# Patient Record
Sex: MALE | Race: WHITE | NOT HISPANIC OR LATINO | Employment: OTHER | URBAN - NONMETROPOLITAN AREA
[De-identification: names, ages, dates, MRNs, and addresses within clinical notes are randomized per-mention and may not be internally consistent; named-entity substitution may affect disease eponyms.]

---

## 2017-11-17 ENCOUNTER — DOCTOR'S OFFICE (OUTPATIENT)
Dept: URBAN - NONMETROPOLITAN AREA CLINIC 1 | Facility: CLINIC | Age: 82
Setting detail: OPHTHALMOLOGY
End: 2017-11-17
Payer: COMMERCIAL

## 2017-11-17 DIAGNOSIS — H02.834: ICD-10-CM

## 2017-11-17 DIAGNOSIS — H02.831: ICD-10-CM

## 2017-11-17 DIAGNOSIS — H43.813: ICD-10-CM

## 2017-11-17 DIAGNOSIS — H53.122: ICD-10-CM

## 2017-11-17 DIAGNOSIS — H18.223: ICD-10-CM

## 2017-11-17 DIAGNOSIS — H04.123: ICD-10-CM

## 2017-11-17 DIAGNOSIS — S01.101A: ICD-10-CM

## 2017-11-17 PROCEDURE — 92226 OPHTHALMOSCOPY EXT SUBSEQUENT: CPT | Performed by: OPHTHALMOLOGY

## 2017-11-17 PROCEDURE — 99214 OFFICE O/P EST MOD 30 MIN: CPT | Performed by: OPHTHALMOLOGY

## 2017-11-17 ASSESSMENT — LID POSITION - PTOSIS
OD_PTOSIS: 2+
OS_PTOSIS: 2+

## 2017-11-17 ASSESSMENT — REFRACTION_MANIFEST
OD_AXIS: 097
OD_VA3: 20/
OS_VA2: 20/
OD_SPHERE: +0.75
OD_ADD: +2.75
OD_VA3: 20/
OD_VA2: 20/
OD_VA1: 20/
OS_ADD: +2.75
OS_VA3: 20/
OD_VA3: 20/
OD_VA1: 20/
OU_VA: 20/
OU_VA: 20/
OD_VA2: 20/
OS_VA2: 20/25
OS_AXIS: 077
OU_VA: 20/
OS_CYLINDER: -2.50
OS_VA1: 20/25-2
OS_VA1: 20/
OS_SPHERE: +0.50
OD_CYLINDER: -2.25
OS_VA3: 20/
OS_VA3: 20/
OD_VA2: 20/25
OS_VA2: 20/
OD_VA1: 20/25-2
OS_VA1: 20/

## 2017-11-17 ASSESSMENT — REFRACTION_CURRENTRX
OS_VPRISM_DIRECTION: BF
OD_OVR_VA: 20/
OD_ADD: +2.75
OS_OVR_VA: 20/
OS_OVR_VA: 20/
OD_SPHERE: +0.75
OS_AXIS: 078
OS_ADD: +2.75
OD_VPRISM_DIRECTION: BF
OS_CYLINDER: -2.25
OD_OVR_VA: 20/
OD_OVR_VA: 20/
OD_CYLINDER: -2.00
OD_AXIS: 100
OS_SPHERE: +0.50
OS_OVR_VA: 20/

## 2017-11-17 ASSESSMENT — SPHEQUIV_DERIVED
OD_SPHEQUIV: 1
OS_SPHEQUIV: -0.125
OS_SPHEQUIV: -0.75
OD_SPHEQUIV: -0.375

## 2017-11-17 ASSESSMENT — LID POSITION - DERMATOCHALASIS
OD_DERMATOCHALASIS: 2+
OS_DERMATOCHALASIS: 2+

## 2017-11-17 ASSESSMENT — REFRACTION_AUTOREFRACTION
OS_SPHERE: +1.00
OD_CYLINDER: -3.00
OS_AXIS: 075
OD_SPHERE: +2.50
OD_AXIS: 097
OS_CYLINDER: -2.25

## 2017-11-17 ASSESSMENT — LID EXAM ASSESSMENTS
OD_ECCHYMOSIS: RLL RUL 2+
OD_EDEMA: RLL RUL 2+
OD_MEIBOMITIS: 1+
OS_MEIBOMITIS: 1+

## 2017-11-17 ASSESSMENT — VISUAL ACUITY
OD_BCVA: 20/60
OS_BCVA: 20/30

## 2017-11-17 ASSESSMENT — SUPERFICIAL PUNCTATE KERATITIS (SPK)
OS_SPK: 2+ 3+
OD_SPK: T 1+

## 2017-11-17 ASSESSMENT — CONFRONTATIONAL VISUAL FIELD TEST (CVF)
OS_FINDINGS: FULL
OD_FINDINGS: FULL

## 2017-11-28 ENCOUNTER — OPTICAL OFFICE (OUTPATIENT)
Dept: URBAN - NONMETROPOLITAN AREA CLINIC 4 | Facility: CLINIC | Age: 82
Setting detail: OPHTHALMOLOGY
End: 2017-11-28

## 2017-11-28 ENCOUNTER — DOCTOR'S OFFICE (OUTPATIENT)
Dept: URBAN - NONMETROPOLITAN AREA CLINIC 1 | Facility: CLINIC | Age: 82
Setting detail: OPHTHALMOLOGY
End: 2017-11-28

## 2017-11-28 DIAGNOSIS — H52.4: ICD-10-CM

## 2017-11-28 DIAGNOSIS — H52.223: ICD-10-CM

## 2017-11-28 DIAGNOSIS — Z96.1: ICD-10-CM

## 2017-11-28 PROCEDURE — V2204 LENS SPHCY BIFOCAL 4.00D/2.1: HCPCS | Performed by: OPTOMETRIST

## 2017-11-28 PROCEDURE — V2020 VISION SVCS FRAMES PURCHASES: HCPCS | Performed by: OPTOMETRIST

## 2017-11-28 PROCEDURE — 92015 DETERMINE REFRACTIVE STATE: CPT | Performed by: OPTOMETRIST

## 2017-11-28 ASSESSMENT — REFRACTION_CURRENTRX
OS_OVR_VA: 20/
OD_OVR_VA: 20/
OS_OVR_VA: 20/
OS_OVR_VA: 20/
OD_OVR_VA: 20/
OS_CYLINDER: -2.25
OS_ADD: +2.75
OD_AXIS: 101
OS_SPHERE: +0.50
OS_VPRISM_DIRECTION: BF
OD_OVR_VA: 20/
OD_VPRISM_DIRECTION: BF
OD_CYLINDER: -2.25
OS_AXIS: 079
OD_ADD: +2.75
OD_SPHERE: +0.75

## 2017-11-28 ASSESSMENT — REFRACTION_AUTOREFRACTION
OD_SPHERE: +2.25
OS_SPHERE: +0.75
OD_AXIS: 092
OS_CYLINDER: -2.75
OS_AXIS: 085
OD_CYLINDER: -4.00

## 2017-11-28 ASSESSMENT — REFRACTION_MANIFEST
OD_VA3: 20/
OU_VA: 20/
OS_VA2: 20/
OS_VA1: 20/
OD_VA1: 20/
OS_VA3: 20/
OD_VA3: 20/
OU_VA: 20/
OS_VA1: 20/
OD_VA2: 20/
OD_VA2: 20/
OS_VA3: 20/
OS_VA2: 20/
OD_VA1: 20/

## 2017-11-28 ASSESSMENT — SPHEQUIV_DERIVED
OS_SPHEQUIV: -0.625
OD_SPHEQUIV: 0.25

## 2017-11-28 ASSESSMENT — REFRACTION_OUTSIDERX
OS_AXIS: 077
OS_ADD: +2.75
OD_VA3: 20/
OS_CYLINDER: -2.50
OD_ADD: +2.75
OS_VA2: 20/25
OS_VA3: 20/
OD_AXIS: 097
OU_VA: 20/
OD_VA1: 20/25-2
OD_CYLINDER: -2.25
OD_VA2: 20/25
OS_VA1: 20/25-2
OS_SPHERE: +0.50
OD_SPHERE: +0.75

## 2017-11-28 ASSESSMENT — VISUAL ACUITY
OD_BCVA: 20/25-2
OS_BCVA: 20/25-2

## 2017-12-04 ENCOUNTER — DOCTOR'S OFFICE (OUTPATIENT)
Dept: URBAN - NONMETROPOLITAN AREA CLINIC 1 | Facility: CLINIC | Age: 82
Setting detail: OPHTHALMOLOGY
End: 2017-12-04
Payer: COMMERCIAL

## 2017-12-04 DIAGNOSIS — H04.123: ICD-10-CM

## 2017-12-04 DIAGNOSIS — H18.223: ICD-10-CM

## 2017-12-04 DIAGNOSIS — H53.122: ICD-10-CM

## 2017-12-04 DIAGNOSIS — S01.101A: ICD-10-CM

## 2017-12-04 DIAGNOSIS — H35.3131: ICD-10-CM

## 2017-12-04 PROCEDURE — 92012 INTRM OPH EXAM EST PATIENT: CPT | Performed by: OPHTHALMOLOGY

## 2017-12-04 PROCEDURE — 92134 CPTRZ OPH DX IMG PST SGM RTA: CPT | Performed by: OPHTHALMOLOGY

## 2017-12-04 ASSESSMENT — REFRACTION_MANIFEST
OD_VA1: 20/
OS_VA1: 20/
OS_VA2: 20/
OD_VA2: 20/
OD_VA3: 20/
OS_VA1: 20/
OS_VA3: 20/
OS_VA2: 20/
OD_VA1: 20/
OU_VA: 20/
OS_VA3: 20/
OD_VA3: 20/
OD_VA2: 20/
OU_VA: 20/

## 2017-12-04 ASSESSMENT — REFRACTION_OUTSIDERX
OS_VA1: 20/25-2
OS_SPHERE: +0.50
OS_VA3: 20/
OD_AXIS: 097
OU_VA: 20/
OD_VA3: 20/
OS_VA2: 20/25
OD_CYLINDER: -2.25
OD_ADD: +2.75
OS_ADD: +2.75
OD_VA2: 20/25
OD_VA1: 20/25-2
OD_SPHERE: +0.75
OS_AXIS: 077
OS_CYLINDER: -2.50

## 2017-12-04 ASSESSMENT — REFRACTION_AUTOREFRACTION
OS_AXIS: 085
OS_SPHERE: +0.75
OD_CYLINDER: -4.00
OD_AXIS: 092
OD_SPHERE: +2.25
OS_CYLINDER: -2.75

## 2017-12-04 ASSESSMENT — REFRACTION_CURRENTRX
OS_OVR_VA: 20/
OD_OVR_VA: 20/
OD_OVR_VA: 20/
OS_VPRISM_DIRECTION: BF
OS_ADD: +2.75
OS_OVR_VA: 20/
OD_AXIS: 101
OD_SPHERE: +0.75
OS_CYLINDER: -2.25
OS_AXIS: 079
OS_SPHERE: +0.50
OD_CYLINDER: -2.25
OD_OVR_VA: 20/
OD_ADD: +2.75
OD_VPRISM_DIRECTION: BF
OS_OVR_VA: 20/

## 2017-12-04 ASSESSMENT — LID POSITION - DERMATOCHALASIS
OD_DERMATOCHALASIS: 2+
OS_DERMATOCHALASIS: 2+

## 2017-12-04 ASSESSMENT — SUPERFICIAL PUNCTATE KERATITIS (SPK)
OS_SPK: 2+ 3+
OD_SPK: T 1+

## 2017-12-04 ASSESSMENT — CONFRONTATIONAL VISUAL FIELD TEST (CVF)
OD_FINDINGS: FULL
OS_FINDINGS: FULL

## 2017-12-04 ASSESSMENT — VISUAL ACUITY
OS_BCVA: 20/100
OD_BCVA: 20/200

## 2017-12-04 ASSESSMENT — LID EXAM ASSESSMENTS
OS_MEIBOMITIS: 1+
OD_MEIBOMITIS: 1+
OD_ECCHYMOSIS: RLL RUL 2+
OD_EDEMA: RLL RUL 2+

## 2017-12-04 ASSESSMENT — SPHEQUIV_DERIVED
OD_SPHEQUIV: 0.25
OS_SPHEQUIV: -0.625

## 2017-12-04 ASSESSMENT — LID POSITION - PTOSIS
OD_PTOSIS: 2+
OS_PTOSIS: 2+

## 2018-01-29 ENCOUNTER — DOCTOR'S OFFICE (OUTPATIENT)
Dept: URBAN - NONMETROPOLITAN AREA CLINIC 1 | Facility: CLINIC | Age: 83
Setting detail: OPHTHALMOLOGY
End: 2018-01-29
Payer: COMMERCIAL

## 2018-01-29 DIAGNOSIS — H53.122: ICD-10-CM

## 2018-01-29 DIAGNOSIS — H04.123: ICD-10-CM

## 2018-01-29 DIAGNOSIS — H18.223: ICD-10-CM

## 2018-01-29 DIAGNOSIS — H35.3131: ICD-10-CM

## 2018-01-29 DIAGNOSIS — H43.811: ICD-10-CM

## 2018-01-29 DIAGNOSIS — S01.101D: ICD-10-CM

## 2018-01-29 DIAGNOSIS — H43.813: ICD-10-CM

## 2018-01-29 DIAGNOSIS — Z96.1: ICD-10-CM

## 2018-01-29 DIAGNOSIS — H43.812: ICD-10-CM

## 2018-01-29 PROCEDURE — 92226 OPHTHALMOSCOPY EXT SUBSEQUENT: CPT | Performed by: OPHTHALMOLOGY

## 2018-01-29 PROCEDURE — 92250 FUNDUS PHOTOGRAPHY W/I&R: CPT | Performed by: OPHTHALMOLOGY

## 2018-01-29 PROCEDURE — 92014 COMPRE OPH EXAM EST PT 1/>: CPT | Performed by: OPHTHALMOLOGY

## 2018-01-29 PROCEDURE — 92235 FLUORESCEIN ANGRPH MLTIFRAME: CPT | Performed by: OPHTHALMOLOGY

## 2018-01-29 ASSESSMENT — REFRACTION_CURRENTRX
OD_OVR_VA: 20/
OS_VPRISM_DIRECTION: BF
OS_OVR_VA: 20/
OD_OVR_VA: 20/
OD_AXIS: 101
OS_AXIS: 079
OD_VPRISM_DIRECTION: BF
OS_OVR_VA: 20/
OS_OVR_VA: 20/
OD_SPHERE: +0.75
OD_CYLINDER: -2.25
OS_CYLINDER: -2.25
OD_ADD: +2.75
OS_SPHERE: +0.50
OD_OVR_VA: 20/
OS_ADD: +2.75

## 2018-01-29 ASSESSMENT — REFRACTION_AUTOREFRACTION
OS_SPHERE: +0.75
OD_CYLINDER: -4.00
OD_AXIS: 092
OS_AXIS: 085
OD_SPHERE: +2.25
OS_CYLINDER: -2.75

## 2018-01-29 ASSESSMENT — LID POSITION - PTOSIS
OD_PTOSIS: 2+
OS_PTOSIS: 2+

## 2018-01-29 ASSESSMENT — REFRACTION_MANIFEST
OD_VA2: 20/
OU_VA: 20/
OS_VA2: 20/
OD_VA3: 20/
OD_VA3: 20/
OD_VA1: 20/
OS_VA2: 20/
OU_VA: 20/
OD_VA2: 20/
OD_VA1: 20/
OS_VA3: 20/
OS_VA1: 20/
OS_VA3: 20/
OS_VA1: 20/

## 2018-01-29 ASSESSMENT — REFRACTION_OUTSIDERX
OD_AXIS: 097
OS_AXIS: 077
OD_ADD: +2.75
OD_SPHERE: +0.75
OD_VA3: 20/
OD_CYLINDER: -2.25
OS_VA2: 20/25
OS_ADD: +2.75
OD_VA1: 20/25-2
OD_VA2: 20/25
OS_CYLINDER: -2.50
OU_VA: 20/
OS_VA1: 20/25-2
OS_SPHERE: +0.50
OS_VA3: 20/

## 2018-01-29 ASSESSMENT — LID POSITION - DERMATOCHALASIS
OS_DERMATOCHALASIS: 2+
OD_DERMATOCHALASIS: 2+

## 2018-01-29 ASSESSMENT — VISUAL ACUITY
OD_BCVA: 20/40
OS_BCVA: 20/40-2

## 2018-01-29 ASSESSMENT — LID EXAM ASSESSMENTS
OS_MEIBOMITIS: 1+
OD_EDEMA: RLL RUL 2+
OD_MEIBOMITIS: 1+
OD_ECCHYMOSIS: RLL RUL 2+

## 2018-01-29 ASSESSMENT — SPHEQUIV_DERIVED
OS_SPHEQUIV: -0.625
OD_SPHEQUIV: 0.25

## 2018-01-29 ASSESSMENT — CONFRONTATIONAL VISUAL FIELD TEST (CVF)
OS_FINDINGS: FULL
OD_FINDINGS: FULL

## 2018-01-29 ASSESSMENT — SUPERFICIAL PUNCTATE KERATITIS (SPK)
OS_SPK: 2+ 3+
OD_SPK: T 1+

## 2018-03-30 ENCOUNTER — DOCTOR'S OFFICE (OUTPATIENT)
Dept: URBAN - NONMETROPOLITAN AREA CLINIC 1 | Facility: CLINIC | Age: 83
Setting detail: OPHTHALMOLOGY
End: 2018-03-30
Payer: COMMERCIAL

## 2018-03-30 DIAGNOSIS — H18.223: ICD-10-CM

## 2018-03-30 DIAGNOSIS — H43.813: ICD-10-CM

## 2018-03-30 DIAGNOSIS — H35.3131: ICD-10-CM

## 2018-03-30 DIAGNOSIS — H43.812: ICD-10-CM

## 2018-03-30 DIAGNOSIS — H04.122: ICD-10-CM

## 2018-03-30 DIAGNOSIS — H43.811: ICD-10-CM

## 2018-03-30 DIAGNOSIS — H04.121: ICD-10-CM

## 2018-03-30 DIAGNOSIS — H04.123: ICD-10-CM

## 2018-03-30 PROCEDURE — 92226 OPHTHALMOSCOPY EXT SUBSEQUENT: CPT | Performed by: OPHTHALMOLOGY

## 2018-03-30 PROCEDURE — 92014 COMPRE OPH EXAM EST PT 1/>: CPT | Performed by: OPHTHALMOLOGY

## 2018-03-30 PROCEDURE — 83861 MICROFLUID ANALY TEARS: CPT | Performed by: OPHTHALMOLOGY

## 2018-03-30 PROCEDURE — 92134 CPTRZ OPH DX IMG PST SGM RTA: CPT | Performed by: OPHTHALMOLOGY

## 2018-03-30 ASSESSMENT — REFRACTION_CURRENTRX
OD_OVR_VA: 20/
OD_SPHERE: +0.75
OS_CYLINDER: -2.25
OS_OVR_VA: 20/
OD_OVR_VA: 20/
OS_OVR_VA: 20/
OS_OVR_VA: 20/
OD_OVR_VA: 20/
OD_AXIS: 101
OD_ADD: +2.75
OD_VPRISM_DIRECTION: BF
OS_VPRISM_DIRECTION: BF
OS_ADD: +2.75
OS_AXIS: 079
OD_CYLINDER: -2.25
OS_SPHERE: +0.50

## 2018-03-30 ASSESSMENT — REFRACTION_AUTOREFRACTION
OD_AXIS: 092
OS_SPHERE: +0.75
OS_CYLINDER: -2.75
OD_SPHERE: +2.25
OS_AXIS: 085
OD_CYLINDER: -4.00

## 2018-03-30 ASSESSMENT — REFRACTION_OUTSIDERX
OS_ADD: +2.75
OD_VA2: 20/25
OS_VA3: 20/
OS_VA2: 20/25
OS_CYLINDER: -2.50
OD_CYLINDER: -2.25
OU_VA: 20/
OD_ADD: +2.75
OD_AXIS: 097
OD_SPHERE: +0.75
OS_AXIS: 077
OD_VA1: 20/25-2
OS_SPHERE: +0.50
OD_VA3: 20/
OS_VA1: 20/25-2

## 2018-03-30 ASSESSMENT — LID EXAM ASSESSMENTS
OD_MEIBOMITIS: 1+
OD_ECCHYMOSIS: RLL RUL 2+
OD_EDEMA: RLL RUL 2+
OS_MEIBOMITIS: 1+

## 2018-03-30 ASSESSMENT — SPHEQUIV_DERIVED
OD_SPHEQUIV: 0.25
OS_SPHEQUIV: -0.625

## 2018-03-30 ASSESSMENT — LID POSITION - PTOSIS
OS_PTOSIS: 2+
OD_PTOSIS: 2+

## 2018-03-30 ASSESSMENT — LID POSITION - DERMATOCHALASIS
OS_DERMATOCHALASIS: 2+
OD_DERMATOCHALASIS: 2+

## 2018-03-30 ASSESSMENT — VISUAL ACUITY
OD_BCVA: 20/30+1
OS_BCVA: 20/20

## 2018-03-30 ASSESSMENT — REFRACTION_MANIFEST
OD_VA1: 20/
OD_VA2: 20/
OU_VA: 20/
OS_VA3: 20/
OS_VA2: 20/
OD_VA1: 20/
OS_VA1: 20/
OD_VA3: 20/
OD_VA3: 20/
OU_VA: 20/
OS_VA2: 20/
OD_VA2: 20/
OS_VA1: 20/
OS_VA3: 20/

## 2018-03-30 ASSESSMENT — CONFRONTATIONAL VISUAL FIELD TEST (CVF)
OS_FINDINGS: FULL
OD_FINDINGS: FULL

## 2018-03-30 ASSESSMENT — SUPERFICIAL PUNCTATE KERATITIS (SPK)
OS_SPK: 2+ 3+
OD_SPK: T 1+

## 2018-06-15 ENCOUNTER — DOCTOR'S OFFICE (OUTPATIENT)
Dept: URBAN - NONMETROPOLITAN AREA CLINIC 1 | Facility: CLINIC | Age: 83
Setting detail: OPHTHALMOLOGY
End: 2018-06-15
Payer: COMMERCIAL

## 2018-06-15 DIAGNOSIS — H35.3131: ICD-10-CM

## 2018-06-15 DIAGNOSIS — H18.223: ICD-10-CM

## 2018-06-15 DIAGNOSIS — H04.122: ICD-10-CM

## 2018-06-15 DIAGNOSIS — H04.123: ICD-10-CM

## 2018-06-15 DIAGNOSIS — H04.121: ICD-10-CM

## 2018-06-15 DIAGNOSIS — H43.811: ICD-10-CM

## 2018-06-15 DIAGNOSIS — H43.812: ICD-10-CM

## 2018-06-15 DIAGNOSIS — H43.813: ICD-10-CM

## 2018-06-15 PROCEDURE — 92134 CPTRZ OPH DX IMG PST SGM RTA: CPT | Performed by: OPHTHALMOLOGY

## 2018-06-15 PROCEDURE — 83861 MICROFLUID ANALY TEARS: CPT | Performed by: OPHTHALMOLOGY

## 2018-06-15 PROCEDURE — 92014 COMPRE OPH EXAM EST PT 1/>: CPT | Performed by: OPHTHALMOLOGY

## 2018-06-15 PROCEDURE — 92226 OPHTHALMOSCOPY EXT SUBSEQUENT: CPT | Performed by: OPHTHALMOLOGY

## 2018-06-15 ASSESSMENT — REFRACTION_CURRENTRX
OS_AXIS: 079
OD_AXIS: 101
OD_SPHERE: +0.75
OS_CYLINDER: -2.25
OS_OVR_VA: 20/
OD_OVR_VA: 20/
OD_VPRISM_DIRECTION: BF
OD_OVR_VA: 20/
OD_OVR_VA: 20/
OD_ADD: +2.75
OS_SPHERE: +0.50
OS_OVR_VA: 20/
OD_CYLINDER: -2.25
OS_ADD: +2.75
OS_VPRISM_DIRECTION: BF
OS_OVR_VA: 20/

## 2018-06-15 ASSESSMENT — SUPERFICIAL PUNCTATE KERATITIS (SPK)
OD_SPK: T 1+
OS_SPK: 2+ 3+

## 2018-06-15 ASSESSMENT — REFRACTION_AUTOREFRACTION
OD_CYLINDER: -4.00
OS_SPHERE: +0.75
OD_AXIS: 092
OS_AXIS: 085
OD_SPHERE: +2.25
OS_CYLINDER: -2.75

## 2018-06-15 ASSESSMENT — REFRACTION_MANIFEST
OU_VA: 20/
OD_VA2: 20/
OS_VA2: 20/
OS_VA1: 20/
OS_VA1: 20/
OD_VA2: 20/
OD_VA3: 20/
OU_VA: 20/
OS_VA3: 20/
OS_VA3: 20/
OS_VA2: 20/
OD_VA3: 20/
OD_VA1: 20/
OD_VA1: 20/

## 2018-06-15 ASSESSMENT — SPHEQUIV_DERIVED
OS_SPHEQUIV: -0.625
OD_SPHEQUIV: 0.25

## 2018-06-15 ASSESSMENT — REFRACTION_OUTSIDERX
OS_AXIS: 077
OS_CYLINDER: -2.50
OS_ADD: +2.75
OD_CYLINDER: -2.25
OD_VA3: 20/
OS_VA2: 20/25
OS_VA3: 20/
OD_AXIS: 097
OD_ADD: +2.75
OS_SPHERE: +0.50
OU_VA: 20/
OD_SPHERE: +0.75
OD_VA2: 20/25
OD_VA1: 20/25-2
OS_VA1: 20/25-2

## 2018-06-15 ASSESSMENT — VISUAL ACUITY
OS_BCVA: 20/20-2
OD_BCVA: 20/30-2

## 2018-06-15 ASSESSMENT — LID EXAM ASSESSMENTS
OD_ECCHYMOSIS: RLL RUL 2+
OD_MEIBOMITIS: 1+
OD_EDEMA: RLL RUL 2+
OS_MEIBOMITIS: 1+

## 2018-06-15 ASSESSMENT — CONFRONTATIONAL VISUAL FIELD TEST (CVF)
OS_FINDINGS: FULL
OD_FINDINGS: FULL

## 2018-06-15 ASSESSMENT — LID POSITION - DERMATOCHALASIS
OD_DERMATOCHALASIS: 2+
OS_DERMATOCHALASIS: 2+

## 2018-06-15 ASSESSMENT — LID POSITION - PTOSIS
OS_PTOSIS: 2+
OD_PTOSIS: 2+

## 2018-09-24 ENCOUNTER — DOCTOR'S OFFICE (OUTPATIENT)
Dept: URBAN - NONMETROPOLITAN AREA CLINIC 1 | Facility: CLINIC | Age: 83
Setting detail: OPHTHALMOLOGY
End: 2018-09-24
Payer: COMMERCIAL

## 2018-09-24 DIAGNOSIS — H04.121: ICD-10-CM

## 2018-09-24 DIAGNOSIS — H35.3131: ICD-10-CM

## 2018-09-24 DIAGNOSIS — H04.123: ICD-10-CM

## 2018-09-24 DIAGNOSIS — H43.812: ICD-10-CM

## 2018-09-24 DIAGNOSIS — H04.122: ICD-10-CM

## 2018-09-24 DIAGNOSIS — H43.813: ICD-10-CM

## 2018-09-24 DIAGNOSIS — H43.811: ICD-10-CM

## 2018-09-24 PROCEDURE — 83861 MICROFLUID ANALY TEARS: CPT | Performed by: OPHTHALMOLOGY

## 2018-09-24 PROCEDURE — 92014 COMPRE OPH EXAM EST PT 1/>: CPT | Performed by: OPHTHALMOLOGY

## 2018-09-24 PROCEDURE — 92226 OPHTHALMOSCOPY EXT SUBSEQUENT: CPT | Performed by: OPHTHALMOLOGY

## 2018-09-24 PROCEDURE — 92134 CPTRZ OPH DX IMG PST SGM RTA: CPT | Performed by: OPHTHALMOLOGY

## 2018-09-24 ASSESSMENT — CONFRONTATIONAL VISUAL FIELD TEST (CVF)
OD_FINDINGS: FULL
OS_FINDINGS: FULL

## 2018-09-24 ASSESSMENT — REFRACTION_CURRENTRX
OS_SPHERE: +0.50
OD_OVR_VA: 20/
OS_AXIS: 079
OS_OVR_VA: 20/
OS_OVR_VA: 20/
OD_OVR_VA: 20/
OD_OVR_VA: 20/
OS_CYLINDER: -2.25
OD_VPRISM_DIRECTION: BF
OS_OVR_VA: 20/
OS_ADD: +2.75
OD_ADD: +2.75
OD_CYLINDER: -2.25
OS_VPRISM_DIRECTION: BF
OD_SPHERE: +0.75
OD_AXIS: 101

## 2018-09-24 ASSESSMENT — VISUAL ACUITY
OD_BCVA: 20/25-2
OS_BCVA: 20/25-1

## 2018-09-24 ASSESSMENT — REFRACTION_MANIFEST
OS_VA2: 20/25
OS_VA1: 20/25-2
OS_CYLINDER: -2.50
OS_VA3: 20/
OD_VA2: 20/25
OD_VA2: 20/
OD_VA3: 20/
OD_VA1: 20/25-2
OS_ADD: +2.75
OD_VA1: 20/
OS_VA3: 20/
OU_VA: 20/
OS_AXIS: 077
OS_VA1: 20/
OD_VA3: 20/
OS_SPHERE: +0.50
OD_SPHERE: +0.75
OS_VA2: 20/
OU_VA: 20/
OD_AXIS: 097
OD_CYLINDER: -2.25
OD_ADD: +2.75

## 2018-09-24 ASSESSMENT — LID EXAM ASSESSMENTS
OD_MEIBOMITIS: 1+
OS_MEIBOMITIS: 1+
OD_ECCHYMOSIS: RLL RUL 2+
OD_EDEMA: RLL RUL 2+

## 2018-09-24 ASSESSMENT — REFRACTION_AUTOREFRACTION
OD_AXIS: 092
OD_CYLINDER: -4.00
OS_AXIS: 085
OS_CYLINDER: -2.75
OS_SPHERE: +0.75
OD_SPHERE: +2.25

## 2018-09-24 ASSESSMENT — SUPERFICIAL PUNCTATE KERATITIS (SPK)
OS_SPK: 2+ 3+
OD_SPK: T 1+

## 2018-09-24 ASSESSMENT — LID POSITION - DERMATOCHALASIS
OD_DERMATOCHALASIS: 2+
OS_DERMATOCHALASIS: 2+

## 2018-09-24 ASSESSMENT — SPHEQUIV_DERIVED
OD_SPHEQUIV: -0.375
OS_SPHEQUIV: -0.75
OD_SPHEQUIV: 0.25
OS_SPHEQUIV: -0.625

## 2018-09-24 ASSESSMENT — LID POSITION - PTOSIS
OS_PTOSIS: 2+
OD_PTOSIS: 2+

## 2018-11-12 ENCOUNTER — DOCTOR'S OFFICE (OUTPATIENT)
Dept: URBAN - NONMETROPOLITAN AREA CLINIC 1 | Facility: CLINIC | Age: 83
Setting detail: OPHTHALMOLOGY
End: 2018-11-12
Payer: COMMERCIAL

## 2018-11-12 DIAGNOSIS — H43.812: ICD-10-CM

## 2018-11-12 DIAGNOSIS — H43.811: ICD-10-CM

## 2018-11-12 DIAGNOSIS — H43.813: ICD-10-CM

## 2018-11-12 DIAGNOSIS — H35.3131: ICD-10-CM

## 2018-11-12 DIAGNOSIS — H04.121: ICD-10-CM

## 2018-11-12 DIAGNOSIS — H04.123: ICD-10-CM

## 2018-11-12 DIAGNOSIS — H04.122: ICD-10-CM

## 2018-11-12 PROCEDURE — 92250 FUNDUS PHOTOGRAPHY W/I&R: CPT | Performed by: OPHTHALMOLOGY

## 2018-11-12 PROCEDURE — 92226 OPHTHALMOSCOPY EXT SUBSEQUENT: CPT | Performed by: OPHTHALMOLOGY

## 2018-11-12 PROCEDURE — 92014 COMPRE OPH EXAM EST PT 1/>: CPT | Performed by: OPHTHALMOLOGY

## 2018-11-12 PROCEDURE — 83861 MICROFLUID ANALY TEARS: CPT | Performed by: OPHTHALMOLOGY

## 2018-11-12 PROCEDURE — 92235 FLUORESCEIN ANGRPH MLTIFRAME: CPT | Performed by: OPHTHALMOLOGY

## 2018-11-12 ASSESSMENT — REFRACTION_CURRENTRX
OD_CYLINDER: -2.25
OS_OVR_VA: 20/
OS_OVR_VA: 20/
OS_CYLINDER: -2.25
OD_AXIS: 101
OS_VPRISM_DIRECTION: BF
OD_OVR_VA: 20/
OS_OVR_VA: 20/
OS_SPHERE: +0.50
OD_SPHERE: +0.75
OS_ADD: +2.75
OD_ADD: +2.75
OD_OVR_VA: 20/
OD_OVR_VA: 20/
OD_VPRISM_DIRECTION: BF
OS_AXIS: 079

## 2018-11-12 ASSESSMENT — REFRACTION_MANIFEST
OD_VA2: 20/25
OS_VA2: 20/
OD_VA1: 20/
OU_VA: 20/
OD_SPHERE: +0.75
OS_CYLINDER: -2.50
OS_VA3: 20/
OD_CYLINDER: -2.25
OS_ADD: +2.75
OD_VA1: 20/25-2
OD_VA3: 20/
OS_SPHERE: +0.50
OS_VA1: 20/
OD_AXIS: 097
OD_VA2: 20/
OS_AXIS: 077
OS_VA3: 20/
OU_VA: 20/
OD_ADD: +2.75
OS_VA2: 20/25
OD_VA3: 20/
OS_VA1: 20/25-2

## 2018-11-12 ASSESSMENT — SPHEQUIV_DERIVED
OD_SPHEQUIV: 0.25
OD_SPHEQUIV: -0.375
OS_SPHEQUIV: -0.75
OS_SPHEQUIV: -0.625

## 2018-11-12 ASSESSMENT — VISUAL ACUITY
OS_BCVA: 20/25-1
OD_BCVA: 20/25-2

## 2018-11-12 ASSESSMENT — LID EXAM ASSESSMENTS
OD_EDEMA: RLL RUL 2+
OS_MEIBOMITIS: 1+
OD_MEIBOMITIS: 1+
OD_ECCHYMOSIS: RLL RUL 2+

## 2018-11-12 ASSESSMENT — SUPERFICIAL PUNCTATE KERATITIS (SPK)
OD_SPK: T 1+
OS_SPK: 2+ 3+

## 2018-11-12 ASSESSMENT — LID POSITION - PTOSIS
OS_PTOSIS: 2+
OD_PTOSIS: 2+

## 2018-11-12 ASSESSMENT — REFRACTION_AUTOREFRACTION
OS_CYLINDER: -2.75
OS_AXIS: 085
OD_CYLINDER: -4.00
OD_AXIS: 092
OD_SPHERE: +2.25
OS_SPHERE: +0.75

## 2018-11-12 ASSESSMENT — CONFRONTATIONAL VISUAL FIELD TEST (CVF)
OD_FINDINGS: FULL
OS_FINDINGS: FULL

## 2018-11-12 ASSESSMENT — LID POSITION - DERMATOCHALASIS
OS_DERMATOCHALASIS: 2+
OD_DERMATOCHALASIS: 2+

## 2019-05-20 ENCOUNTER — DOCTOR'S OFFICE (OUTPATIENT)
Dept: URBAN - NONMETROPOLITAN AREA CLINIC 1 | Facility: CLINIC | Age: 84
Setting detail: OPHTHALMOLOGY
End: 2019-05-20

## 2019-05-20 ENCOUNTER — OPTICAL OFFICE (OUTPATIENT)
Dept: URBAN - NONMETROPOLITAN AREA CLINIC 4 | Facility: CLINIC | Age: 84
Setting detail: OPHTHALMOLOGY
End: 2019-05-20

## 2019-05-20 DIAGNOSIS — Z96.1: ICD-10-CM

## 2019-05-20 DIAGNOSIS — H52.223: ICD-10-CM

## 2019-05-20 DIAGNOSIS — H52.4: ICD-10-CM

## 2019-05-20 PROBLEM — H18.223: Status: ACTIVE | Noted: 2017-11-17

## 2019-05-20 PROBLEM — H04.123 DRY EYE; BOTH EYES ;
OS>OD: Status: ACTIVE | Noted: 2017-11-17

## 2019-05-20 PROBLEM — H02.834 DERMATOCHALASIS; RIGHT UPPER LID, LEFT UPPER LID: Status: ACTIVE | Noted: 2017-11-17

## 2019-05-20 PROBLEM — H53.122 SUBJECTIVE VISUAL DISTURBANCE; LEFT EYE: Status: ACTIVE | Noted: 2017-11-17

## 2019-05-20 PROBLEM — H02.831 DERMATOCHALASIS; RIGHT UPPER LID, LEFT UPPER LID: Status: ACTIVE | Noted: 2017-11-17

## 2019-05-20 PROBLEM — S01.101D: Status: ACTIVE | Noted: 2018-01-29

## 2019-05-20 PROBLEM — H43.813 POST VITREOUS DETACHMENT; BOTH EYES: Status: ACTIVE | Noted: 2017-11-17

## 2019-05-20 PROCEDURE — V2204 LENS SPHCY BIFOCAL 4.00D/2.1: HCPCS | Performed by: OPTOMETRIST

## 2019-05-20 PROCEDURE — V2020 VISION SVCS FRAMES PURCHASES: HCPCS | Performed by: OPTOMETRIST

## 2019-05-20 PROCEDURE — 92015 DETERMINE REFRACTIVE STATE: CPT | Performed by: OPTOMETRIST

## 2019-05-20 ASSESSMENT — REFRACTION_MANIFEST
OS_VA2: 20/25
OU_VA: 20/
OS_VA1: 20/
OS_SPHERE: +0.50
OD_ADD: +2.75
OD_VA3: 20/
OS_VA2: 20/
OS_CYLINDER: -2.50
OS_VA1: 20/40
OD_VA3: 20/
OD_AXIS: 097
OD_VA2: 20/25
OU_VA: 20/
OD_SPHERE: +0.75
OD_VA2: 20/
OD_VA1: 20/30
OS_ADD: +2.75
OS_VA3: 20/
OS_AXIS: 077
OS_VA3: 20/
OD_VA1: 20/
OD_CYLINDER: -2.50

## 2019-05-20 ASSESSMENT — REFRACTION_CURRENTRX
OS_CYLINDER: -2.25
OS_AXIS: 079
OD_OVR_VA: 20/
OD_OVR_VA: 20/
OD_CYLINDER: -2.25
OD_SPHERE: +0.75
OD_AXIS: 101
OS_VPRISM_DIRECTION: BF
OS_SPHERE: +0.50
OD_ADD: +2.75
OD_OVR_VA: 20/
OS_OVR_VA: 20/
OD_VPRISM_DIRECTION: BF
OS_ADD: +2.75
OS_OVR_VA: 20/
OS_OVR_VA: 20/

## 2019-05-20 ASSESSMENT — REFRACTION_AUTOREFRACTION
OD_CYLINDER: -4.00
OS_AXIS: 085
OD_SPHERE: +2.25
OD_AXIS: 092
OS_SPHERE: +0.75
OS_CYLINDER: -2.75

## 2019-05-20 ASSESSMENT — VISUAL ACUITY
OD_BCVA: 20/40
OS_BCVA: 20/30

## 2019-05-20 ASSESSMENT — SPHEQUIV_DERIVED
OD_SPHEQUIV: 0.25
OS_SPHEQUIV: -0.625
OS_SPHEQUIV: -0.75
OD_SPHEQUIV: -0.5

## 2019-06-03 ENCOUNTER — OPTICAL OFFICE (OUTPATIENT)
Dept: URBAN - NONMETROPOLITAN AREA CLINIC 4 | Facility: CLINIC | Age: 84
Setting detail: OPHTHALMOLOGY
End: 2019-06-03

## 2019-06-03 DIAGNOSIS — H52.223: ICD-10-CM

## 2019-06-03 PROCEDURE — V2745 TINT, ANY COLOR/SOLID/GRAD: HCPCS | Performed by: OPTOMETRIST

## 2019-09-06 ENCOUNTER — OPTICAL OFFICE (OUTPATIENT)
Dept: URBAN - NONMETROPOLITAN AREA CLINIC 4 | Facility: CLINIC | Age: 84
Setting detail: OPHTHALMOLOGY
End: 2019-09-06

## 2019-09-06 DIAGNOSIS — H52.223: ICD-10-CM

## 2019-09-06 PROCEDURE — V2020 VISION SVCS FRAMES PURCHASES: HCPCS | Performed by: OPTOMETRIST

## 2021-01-20 DIAGNOSIS — Z23 ENCOUNTER FOR IMMUNIZATION: ICD-10-CM

## 2021-01-22 ENCOUNTER — IMMUNIZATIONS (OUTPATIENT)
Dept: FAMILY MEDICINE CLINIC | Facility: HOSPITAL | Age: 86
End: 2021-01-22

## 2021-01-22 DIAGNOSIS — Z23 ENCOUNTER FOR IMMUNIZATION: Primary | ICD-10-CM

## 2021-01-22 PROCEDURE — 91301 SARS-COV-2 / COVID-19 MRNA VACCINE (MODERNA) 100 MCG: CPT

## 2021-01-22 PROCEDURE — 0011A SARS-COV-2 / COVID-19 MRNA VACCINE (MODERNA) 100 MCG: CPT

## 2021-02-26 ENCOUNTER — IMMUNIZATIONS (OUTPATIENT)
Dept: FAMILY MEDICINE CLINIC | Facility: HOSPITAL | Age: 86
End: 2021-02-26

## 2021-02-26 DIAGNOSIS — Z23 ENCOUNTER FOR IMMUNIZATION: Primary | ICD-10-CM

## 2021-02-26 PROCEDURE — 0012A SARS-COV-2 / COVID-19 MRNA VACCINE (MODERNA) 100 MCG: CPT

## 2021-02-26 PROCEDURE — 91301 SARS-COV-2 / COVID-19 MRNA VACCINE (MODERNA) 100 MCG: CPT

## 2024-04-10 ENCOUNTER — OPTICAL OFFICE (OUTPATIENT)
Dept: URBAN - NONMETROPOLITAN AREA CLINIC 4 | Facility: CLINIC | Age: 89
Setting detail: OPHTHALMOLOGY
End: 2024-04-10
Payer: COMMERCIAL

## 2024-04-10 ENCOUNTER — RX ONLY (RX ONLY)
Age: 89
End: 2024-04-10

## 2024-04-10 ENCOUNTER — DOCTOR'S OFFICE (OUTPATIENT)
Dept: URBAN - NONMETROPOLITAN AREA CLINIC 1 | Facility: CLINIC | Age: 89
Setting detail: OPHTHALMOLOGY
End: 2024-04-10
Payer: COMMERCIAL

## 2024-04-10 DIAGNOSIS — H04.123: ICD-10-CM

## 2024-04-10 DIAGNOSIS — H52.223: ICD-10-CM

## 2024-04-10 DIAGNOSIS — H35.3131: ICD-10-CM

## 2024-04-10 DIAGNOSIS — H43.813: ICD-10-CM

## 2024-04-10 DIAGNOSIS — H02.403: ICD-10-CM

## 2024-04-10 PROBLEM — H02.83 DERMATOCHALASIS; RIGHT UPPER LID, LEFT UPPER LID: Status: ACTIVE | Noted: 2024-04-10

## 2024-04-10 PROCEDURE — V2020 VISION SVCS FRAMES PURCHASES: HCPCS | Performed by: OPTOMETRIST

## 2024-04-10 PROCEDURE — V2204 LENS SPHCY BIFOCAL 4.00D/2.1: HCPCS | Mod: LT | Performed by: OPTOMETRIST

## 2024-04-10 PROCEDURE — 92134 CPTRZ OPH DX IMG PST SGM RTA: CPT | Performed by: OPTOMETRIST

## 2024-04-10 PROCEDURE — V2744 TINT PHOTOCHROMATIC LENS/ES: HCPCS | Mod: LT | Performed by: OPTOMETRIST

## 2024-04-10 PROCEDURE — V2744 TINT PHOTOCHROMATIC LENS/ES: HCPCS | Performed by: OPTOMETRIST

## 2024-04-10 PROCEDURE — V2204 LENS SPHCY BIFOCAL 4.00D/2.1: HCPCS | Performed by: OPTOMETRIST

## 2024-04-10 PROCEDURE — 92004 COMPRE OPH EXAM NEW PT 1/>: CPT | Performed by: OPTOMETRIST

## 2024-04-10 ASSESSMENT — LID POSITION - PTOSIS
OD_PTOSIS: 2+
OS_PTOSIS: 2+

## 2024-04-10 ASSESSMENT — LID POSITION - DERMATOCHALASIS
OD_DERMATOCHALASIS: 2+
OS_DERMATOCHALASIS: 2+

## 2025-06-19 ENCOUNTER — APPOINTMENT (EMERGENCY)
Dept: CT IMAGING | Facility: HOSPITAL | Age: OVER 89
End: 2025-06-19
Payer: COMMERCIAL

## 2025-06-19 ENCOUNTER — APPOINTMENT (EMERGENCY)
Dept: RADIOLOGY | Facility: HOSPITAL | Age: OVER 89
End: 2025-06-19
Payer: COMMERCIAL

## 2025-06-19 ENCOUNTER — HOSPITAL ENCOUNTER (EMERGENCY)
Facility: HOSPITAL | Age: OVER 89
Discharge: HOME | End: 2025-06-19
Attending: EMERGENCY MEDICINE
Payer: COMMERCIAL

## 2025-06-19 VITALS
WEIGHT: 161.82 LBS | HEART RATE: 72 BPM | SYSTOLIC BLOOD PRESSURE: 111 MMHG | DIASTOLIC BLOOD PRESSURE: 55 MMHG | TEMPERATURE: 98.5 F | OXYGEN SATURATION: 95 % | RESPIRATION RATE: 16 BRPM

## 2025-06-19 DIAGNOSIS — R19.7 DIARRHEA, UNSPECIFIED TYPE: Primary | ICD-10-CM

## 2025-06-19 LAB
ALBUMIN SERPL BCG-MCNC: 3.9 G/DL (ref 3.5–5)
ALP SERPL-CCNC: 103 U/L (ref 34–104)
ALT SERPL W P-5'-P-CCNC: 14 U/L (ref 7–52)
ANION GAP SERPL CALCULATED.3IONS-SCNC: 7 MMOL/L (ref 4–13)
AST SERPL W P-5'-P-CCNC: 19 U/L (ref 13–39)
BASOPHILS # BLD AUTO: 0.06 THOUSANDS/ÂΜL (ref 0–0.1)
BASOPHILS NFR BLD AUTO: 1 % (ref 0–1)
BILIRUB SERPL-MCNC: 0.28 MG/DL (ref 0.2–1)
BNP SERPL-MCNC: 152 PG/ML (ref 0–100)
BUN SERPL-MCNC: 20 MG/DL (ref 5–25)
CALCIUM SERPL-MCNC: 8.7 MG/DL (ref 8.4–10.2)
CARDIAC TROPONIN I PNL SERPL HS: 9 NG/L (ref ?–50)
CHLORIDE SERPL-SCNC: 97 MMOL/L (ref 96–108)
CO2 SERPL-SCNC: 31 MMOL/L (ref 21–32)
CREAT SERPL-MCNC: 0.89 MG/DL (ref 0.6–1.3)
EOSINOPHIL # BLD AUTO: 0.16 THOUSAND/ÂΜL (ref 0–0.61)
EOSINOPHIL NFR BLD AUTO: 2 % (ref 0–6)
ERYTHROCYTE [DISTWIDTH] IN BLOOD BY AUTOMATED COUNT: 13.6 % (ref 11.6–15.1)
GFR SERPL CREATININE-BSD FRML MDRD: 72 ML/MIN/1.73SQ M
GLUCOSE SERPL-MCNC: 132 MG/DL (ref 65–140)
HCT VFR BLD AUTO: 43.2 % (ref 36.5–49.3)
HGB BLD-MCNC: 14.5 G/DL (ref 12–17)
IMM GRANULOCYTES # BLD AUTO: 0.02 THOUSAND/UL (ref 0–0.2)
IMM GRANULOCYTES NFR BLD AUTO: 0 % (ref 0–2)
LACTATE SERPL-SCNC: 1.6 MMOL/L (ref 0.5–2)
LIPASE SERPL-CCNC: 37 U/L (ref 11–82)
LYMPHOCYTES # BLD AUTO: 1.72 THOUSANDS/ÂΜL (ref 0.6–4.47)
LYMPHOCYTES NFR BLD AUTO: 23 % (ref 14–44)
MAGNESIUM SERPL-MCNC: 2.4 MG/DL (ref 1.9–2.7)
MCH RBC QN AUTO: 31.6 PG (ref 26.8–34.3)
MCHC RBC AUTO-ENTMCNC: 33.6 G/DL (ref 31.4–37.4)
MCV RBC AUTO: 94 FL (ref 82–98)
MONOCYTES # BLD AUTO: 0.8 THOUSAND/ÂΜL (ref 0.17–1.22)
MONOCYTES NFR BLD AUTO: 11 % (ref 4–12)
NEUTROPHILS # BLD AUTO: 4.71 THOUSANDS/ÂΜL (ref 1.85–7.62)
NEUTS SEG NFR BLD AUTO: 63 % (ref 43–75)
NRBC BLD AUTO-RTO: 0 /100 WBCS
PLATELET # BLD AUTO: 147 THOUSANDS/UL (ref 149–390)
PMV BLD AUTO: 10.5 FL (ref 8.9–12.7)
POTASSIUM SERPL-SCNC: 4 MMOL/L (ref 3.5–5.3)
PROT SERPL-MCNC: 6.6 G/DL (ref 6.4–8.4)
RBC # BLD AUTO: 4.59 MILLION/UL (ref 3.88–5.62)
SODIUM SERPL-SCNC: 135 MMOL/L (ref 135–147)
WBC # BLD AUTO: 7.47 THOUSAND/UL (ref 4.31–10.16)

## 2025-06-19 PROCEDURE — 80053 COMPREHEN METABOLIC PANEL: CPT | Performed by: EMERGENCY MEDICINE

## 2025-06-19 PROCEDURE — 83605 ASSAY OF LACTIC ACID: CPT | Performed by: EMERGENCY MEDICINE

## 2025-06-19 PROCEDURE — 99285 EMERGENCY DEPT VISIT HI MDM: CPT

## 2025-06-19 PROCEDURE — 96360 HYDRATION IV INFUSION INIT: CPT

## 2025-06-19 PROCEDURE — 74177 CT ABD & PELVIS W/CONTRAST: CPT

## 2025-06-19 PROCEDURE — 83690 ASSAY OF LIPASE: CPT | Performed by: EMERGENCY MEDICINE

## 2025-06-19 PROCEDURE — 71045 X-RAY EXAM CHEST 1 VIEW: CPT

## 2025-06-19 PROCEDURE — 84484 ASSAY OF TROPONIN QUANT: CPT | Performed by: EMERGENCY MEDICINE

## 2025-06-19 PROCEDURE — 85025 COMPLETE CBC W/AUTO DIFF WBC: CPT | Performed by: EMERGENCY MEDICINE

## 2025-06-19 PROCEDURE — 93005 ELECTROCARDIOGRAM TRACING: CPT

## 2025-06-19 PROCEDURE — 99285 EMERGENCY DEPT VISIT HI MDM: CPT | Performed by: EMERGENCY MEDICINE

## 2025-06-19 PROCEDURE — 83735 ASSAY OF MAGNESIUM: CPT | Performed by: EMERGENCY MEDICINE

## 2025-06-19 PROCEDURE — 83880 ASSAY OF NATRIURETIC PEPTIDE: CPT | Performed by: EMERGENCY MEDICINE

## 2025-06-19 PROCEDURE — 36415 COLL VENOUS BLD VENIPUNCTURE: CPT | Performed by: EMERGENCY MEDICINE

## 2025-06-19 RX ADMIN — IOHEXOL 75 ML: 350 INJECTION, SOLUTION INTRAVENOUS at 18:38

## 2025-06-19 RX ADMIN — SODIUM CHLORIDE 250 ML: 0.9 INJECTION, SOLUTION INTRAVENOUS at 18:09

## 2025-06-19 NOTE — ED NOTES
This RN assumed care of this patient at this time. Patient educated to ring call bell when patient can provide stool sample.      Elizabeth Mcnamara RN  06/19/25 9912

## 2025-06-19 NOTE — ED PROVIDER NOTES
Time reflects when diagnosis was documented in both MDM as applicable and the Disposition within this note       Time User Action Codes Description Comment    6/19/2025  8:01 PM Armin Calixto Add [R19.7] Diarrhea, unspecified type           ED Disposition       ED Disposition   Discharge    Condition   Stable    Date/Time   Thu Jun 19, 2025  7:47 PM    Comment   Vinay Aguila discharge to home/self care.                   Assessment & Plan       Medical Decision Making  89-year-old male presents to the emergency department with diarrhea and abdominal pain, differential diagnosis include diverticulitis, C. difficile, bacterial/viral gastritis/gastroenteritis/infectious diarrhea, medication induced, urinary tract infection, will perform CT abdomen pelvis with IV contrast, basic labs, urinalysis, provide patient with fluids, and perform stool studies.    Amount and/or Complexity of Data Reviewed  Labs: ordered.  Radiology: ordered.    Risk  Prescription drug management.        ED Course as of 06/19/25 2003   Thu Jun 19, 2025 1947 IMPRESSION:     Findings consistent with mild enteritis and diarrhea.     Partially visualized patulous esophagus containing solid and liquid contents. Possible achalasia.     1958 Discussed all the results with the patient, and family, they will follow-up with primary care doctor outpatient for stool sample, he was unable to provide a stool sample today, they will be evaluated for C. difficile outpatient.  Strict return precautions given.  Patient understands and agrees with treatment plan.       Medications   sodium chloride 0.9 % bolus 250 mL (0 mL Intravenous Stopped 6/19/25 1856)   iohexol (OMNIPAQUE) 350 MG/ML injection (MULTI-DOSE) 75 mL (75 mL Intravenous Given 6/19/25 1838)       ED Risk Strat Scores                    No data recorded        SBIRT 20yo+      Flowsheet Row Most Recent Value   Initial Alcohol Screen: US AUDIT-C     1. How often do you have a drink containing alcohol?  0 Filed at: 06/19/2025 1739   2. How many drinks containing alcohol do you have on a typical day you are drinking?  0 Filed at: 06/19/2025 1739   3b. FEMALE Any Age, or MALE 65+: How often do you have 4 or more drinks on one occassion? 0 Filed at: 06/19/2025 1739   Audit-C Score 0 Filed at: 06/19/2025 1739   ANTOINETTE: How many times in the past year have you...    Used an illegal drug or used a prescription medication for non-medical reasons? Never Filed at: 06/19/2025 1739                            History of Present Illness       Chief Complaint   Patient presents with    Diarrhea     Pt arrives from home with c/o diarrhea x1 month. Pt having lower abd pain x few days.        Past Medical History[1]   Past Surgical History[2]   Family History[3]   Social History[4]   E-Cigarette/Vaping    E-Cigarette Use Never User       E-Cigarette/Vaping Substances      I have reviewed and agree with the history as documented.     89-year-old male with past medical history listed below presents to the emergency department with complaint of diarrhea for weeks, now associated with abdominal pain.  Patient states that he takes stool softeners, MiraLAX, and prune juice for his bowel movements, he states that over the past few weeks, he has had watery stools that have been persistent, it was up until this week that patient started to experience lower abdominal pain which was concerning for him.  Patient denies any chills, fevers, chest pain, shortness of breath, bloody stool, bloody urine, or pain with urination.  Patient denies history of C. difficile, or recent antibiotics, or travel.      Diarrhea  Associated symptoms: abdominal pain    Associated symptoms: no arthralgias, no chills, no fever and no vomiting        Review of Systems   Constitutional:  Negative for chills and fever.   HENT:  Negative for ear pain and sore throat.    Eyes:  Negative for pain and visual disturbance.   Respiratory:  Negative for cough and shortness of  breath.    Cardiovascular:  Negative for chest pain and palpitations.   Gastrointestinal:  Positive for abdominal pain and diarrhea. Negative for vomiting.   Genitourinary:  Negative for dysuria and hematuria.   Musculoskeletal:  Negative for arthralgias and back pain.   Skin:  Negative for color change and rash.   Neurological:  Negative for seizures and syncope.   All other systems reviewed and are negative.          Objective       ED Triage Vitals   Temperature Pulse Blood Pressure Respirations SpO2 Patient Position - Orthostatic VS   06/19/25 1734 06/19/25 1734 06/19/25 1734 06/19/25 1734 06/19/25 1734 06/19/25 1734   98.5 °F (36.9 °C) (!) 106 121/60 20 95 % Sitting      Temp Source Heart Rate Source BP Location FiO2 (%) Pain Score    06/19/25 1734 06/19/25 1734 06/19/25 1800 -- 06/19/25 1734    Temporal Monitor Left arm  5      Vitals      Date and Time Temp Pulse SpO2 Resp BP Pain Score FACES Pain Rating User   06/19/25 1930 -- 72 95 % 16 111/55 -- -- AD   06/19/25 1900 -- 74 93 % 16 117/58 -- -- AD   06/19/25 1845 -- 76 93 % 16 114/51 -- -- MB   06/19/25 1800 -- 79 95 % 16 114/59 -- -- MB   06/19/25 1734 98.5 °F (36.9 °C) 106 95 % 20 121/60 5 -- MD            Physical Exam  Vitals and nursing note reviewed.   Constitutional:       General: He is not in acute distress.     Appearance: He is well-developed.   HENT:      Head: Normocephalic and atraumatic.     Eyes:      Conjunctiva/sclera: Conjunctivae normal.       Cardiovascular:      Rate and Rhythm: Normal rate and regular rhythm.      Heart sounds: No murmur heard.  Pulmonary:      Effort: Pulmonary effort is normal. No respiratory distress.      Breath sounds: Normal breath sounds.   Abdominal:      Palpations: Abdomen is soft.      Tenderness: There is abdominal tenderness.     Musculoskeletal:         General: No swelling.      Cervical back: Neck supple.     Skin:     General: Skin is warm and dry.      Capillary Refill: Capillary refill takes less  than 2 seconds.     Neurological:      Mental Status: He is alert.     Psychiatric:         Mood and Affect: Mood normal.         Results Reviewed       Procedure Component Value Units Date/Time    HS Troponin I 4hr [[id number]]     Lab Status: No result Specimen: Blood     B-Type Natriuretic Peptide(BNP) [[id number]]  (Abnormal) Collected: 06/19/25 1748    Lab Status: Final result Specimen: Blood from Arm, Right Updated: 06/19/25 1846      pg/mL     HS Troponin 0hr (reflex protocol) [[id number]]  (Normal) Collected: 06/19/25 1748    Lab Status: Final result Specimen: Blood from Arm, Right Updated: 06/19/25 1840     hs TnI 0hr 9 ng/L     HS Troponin I 2hr [[id number]]     Lab Status: No result Specimen: Blood     CMP [[id number]] Collected: 06/19/25 1748    Lab Status: Final result Specimen: Blood from Arm, Right Updated: 06/19/25 1831     Sodium 135 mmol/L      Potassium 4.0 mmol/L      Chloride 97 mmol/L      CO2 31 mmol/L      ANION GAP 7 mmol/L      BUN 20 mg/dL      Creatinine 0.89 mg/dL      Glucose 132 mg/dL      Calcium 8.7 mg/dL      AST 19 U/L      ALT 14 U/L      Alkaline Phosphatase 103 U/L      Total Protein 6.6 g/dL      Albumin 3.9 g/dL      Total Bilirubin 0.28 mg/dL      eGFR 72 ml/min/1.73sq m     Narrative:      [employer] guidelines for Chronic Kidney Disease (CKD):     Stage 1 with normal or high GFR (GFR > 90 mL/min/1.73 square meters)    Stage 2 Mild CKD (GFR = 60-89 mL/min/1.73 square meters)    Stage 3A Moderate CKD (GFR = 45-59 mL/min/1.73 square meters)    Stage 3B Moderate CKD (GFR = 30-44 mL/min/1.73 square meters)    Stage 4 Severe CKD (GFR = 15-29 mL/min/1.73 square meters)    Stage 5 End Stage CKD (GFR <15 mL/min/1.73 square meters)  Note: GFR calculation is accurate only with a steady state creatinine    Lipase [[id number]]  (Normal) Collected: 06/19/25 1748    Lab Status: Final result Specimen: Blood from Arm, Right Updated: 06/19/25 1831     Lipase 37 u/L      Magnesium [[id number]]  (Normal) Collected: 06/19/25 1748    Lab Status: Final result Specimen: Blood from Arm, Right Updated: 06/19/25 1831     Magnesium 2.4 mg/dL     Lactic acid, plasma (w/reflex if result > 2.0) [[id number]]  (Normal) Collected: 06/19/25 1748    Lab Status: Final result Specimen: Blood from Arm, Right Updated: 06/19/25 1830     LACTIC ACID 1.6 mmol/L     Narrative:      Result may be elevated if tourniquet was used during collection.    CBC and differential [[id number]]  (Abnormal) Collected: 06/19/25 1748    Lab Status: Final result Specimen: Blood from Arm, Right Updated: 06/19/25 1814     WBC 7.47 Thousand/uL      RBC 4.59 Million/uL      Hemoglobin 14.5 g/dL      Hematocrit 43.2 %      MCV 94 fL      MCH 31.6 pg      MCHC 33.6 g/dL      RDW 13.6 %      MPV 10.5 fL      Platelets 147 Thousands/uL      nRBC 0 /100 WBCs      Segmented % 63 %      Immature Grans % 0 %      Lymphocytes % 23 %      Monocytes % 11 %      Eosinophils Relative 2 %      Basophils Relative 1 %      Absolute Neutrophils 4.71 Thousands/µL      Absolute Immature Grans 0.02 Thousand/uL      Absolute Lymphocytes 1.72 Thousands/µL      Absolute Monocytes 0.80 Thousand/µL      Eosinophils Absolute 0.16 Thousand/µL      Basophils Absolute 0.06 Thousands/µL     Clostridium difficile toxin by PCR with EIA [[id number]]     Lab Status: No result Specimen: Stool from Per Rectum     Stool Enteric Bacterial Panel by PCR [[id number]]     Lab Status: No result Specimen: Stool     UA w Reflex to Microscopic w Reflex to Culture [[id number]]     Lab Status: No result Specimen: Urine             CT abdomen pelvis with contrast   Final Interpretation by Nitin Bower MD (06/19 1936)      Findings consistent with mild enteritis and diarrhea.      Partially visualized patulous esophagus containing solid and liquid contents. Possible achalasia.         Workstation performed: [id number]         XR chest 1 view portable    (Results Pending)        Procedures    ED Medication and Procedure Management   None     Patient's Medications    No medications on file       ED SEPSIS DOCUMENTATION   Time reflects when diagnosis was documented in both MDM as applicable and the Disposition within this note       Time User Action Codes Description Comment    6/19/2025  8:01 PM Armin Calixto Add [R19.7] Diarrhea, unspecified type                    [1] No past medical history on file.  [2] No past surgical history on file.  [3] No family history on file.  [4]   Social History  Tobacco Use    Smoking status: Never    Smokeless tobacco: Never   Vaping Use    Vaping status: Never Used   Substance Use Topics    Alcohol use: Not Currently    Drug use: Never        Armin Calixto DO  06/19/25 2003

## 2025-06-20 LAB
ATRIAL RATE: 76 BPM
P AXIS: 37 DEGREES
PR INTERVAL: 188 MS
QRS AXIS: -45 DEGREES
QRSD INTERVAL: 88 MS
QT INTERVAL: 394 MS
QTC INTERVAL: 443 MS
T WAVE AXIS: 62 DEGREES
VENTRICULAR RATE: 76 BPM

## 2025-06-20 PROCEDURE — 93010 ELECTROCARDIOGRAM REPORT: CPT | Performed by: INTERNAL MEDICINE

## 2025-06-20 NOTE — DISCHARGE INSTRUCTIONS
You were seen in the emergency department for abdominal pain, and diarrhea, your CT scan results are consistent with diarrhea.  You are unable to provide a stool sample here.  Please follow-up with your primary care doctor and gastroenterology outpatient for further evaluation.  If your symptoms worsen or persist, please return to the emergency department immediately.  Please discuss checking your stool for C. difficile with your primary care doctor.